# Patient Record
Sex: FEMALE | URBAN - METROPOLITAN AREA
[De-identification: names, ages, dates, MRNs, and addresses within clinical notes are randomized per-mention and may not be internally consistent; named-entity substitution may affect disease eponyms.]

---

## 2022-01-27 ENCOUNTER — APPOINTMENT (RX ONLY)
Dept: URBAN - METROPOLITAN AREA CLINIC 111 | Facility: CLINIC | Age: 37
Setting detail: DERMATOLOGY
End: 2022-01-27

## 2022-01-27 DIAGNOSIS — L259 CONTACT DERMATITIS AND OTHER ECZEMA, UNSPECIFIED CAUSE: ICD-10-CM

## 2022-01-27 DIAGNOSIS — L21.8 OTHER SEBORRHEIC DERMATITIS: ICD-10-CM | Status: INADEQUATELY CONTROLLED

## 2022-01-27 PROBLEM — L23.9 ALLERGIC CONTACT DERMATITIS, UNSPECIFIED CAUSE: Status: ACTIVE | Noted: 2022-01-27

## 2022-01-27 PROCEDURE — 99203 OFFICE O/P NEW LOW 30 MIN: CPT | Mod: 25

## 2022-01-27 PROCEDURE — ? INTRAMUSCULAR KENALOG

## 2022-01-27 PROCEDURE — ? DIAGNOSIS COMMENT

## 2022-01-27 PROCEDURE — ? COUNSELING

## 2022-01-27 PROCEDURE — ? PRESCRIPTION MEDICATION MANAGEMENT

## 2022-01-27 PROCEDURE — 96372 THER/PROPH/DIAG INJ SC/IM: CPT

## 2022-01-27 PROCEDURE — ? PHOTO-DOCUMENTATION

## 2022-01-27 PROCEDURE — ? PRESCRIPTION

## 2022-01-27 RX ORDER — CICLOPIROX 10 MG/.96ML
SHAMPOO TOPICAL
Qty: 120 | Refills: 11 | Status: ERX | COMMUNITY
Start: 2022-01-27

## 2022-01-27 RX ORDER — PIMECROLIMUS 10 MG/G
CREAM TOPICAL
Qty: 30 | Refills: 0 | Status: ERX | COMMUNITY
Start: 2022-01-27

## 2022-01-27 RX ORDER — FLUOCINONIDE 0.5 MG/ML
SOLUTION TOPICAL
Qty: 60 | Refills: 2 | Status: ERX | COMMUNITY
Start: 2022-01-27

## 2022-01-27 RX ADMIN — PIMECROLIMUS: 10 CREAM TOPICAL at 00:00

## 2022-01-27 RX ADMIN — FLUOCINONIDE: 0.5 SOLUTION TOPICAL at 00:00

## 2022-01-27 RX ADMIN — CICLOPIROX: 10 SHAMPOO TOPICAL at 00:00

## 2022-01-27 ASSESSMENT — LOCATION ZONE DERM
LOCATION ZONE: TRUNK
LOCATION ZONE: SCALP
LOCATION ZONE: FACE
LOCATION ZONE: EYELID

## 2022-01-27 ASSESSMENT — LOCATION SIMPLE DESCRIPTION DERM
LOCATION SIMPLE: SCALP
LOCATION SIMPLE: LEFT EYEBROW
LOCATION SIMPLE: RIGHT SUPERIOR EYELID
LOCATION SIMPLE: RIGHT EYEBROW
LOCATION SIMPLE: RIGHT LOWER BACK
LOCATION SIMPLE: LEFT SUPERIOR EYELID

## 2022-01-27 ASSESSMENT — LOCATION DETAILED DESCRIPTION DERM
LOCATION DETAILED: LEFT MEDIAL SUPERIOR EYELID
LOCATION DETAILED: RIGHT INFERIOR LATERAL LOWER BACK
LOCATION DETAILED: RIGHT CENTRAL EYEBROW
LOCATION DETAILED: LEFT SUPERIOR PARIETAL SCALP
LOCATION DETAILED: LEFT CENTRAL EYEBROW
LOCATION DETAILED: RIGHT LATERAL SUPERIOR EYELID

## 2022-01-27 NOTE — PROCEDURE: DIAGNOSIS COMMENT
Comment: Plan to obtain insurance benefits for patch testing. Of note patient has a h/o getting false eyelashes, nails, and hair dye. Patient elects for IMK today and will push out patch testing 1 month. Discussed d/c eyelashes once current set falls out.
Detail Level: Simple
Render Risk Assessment In Note?: no

## 2022-01-27 NOTE — HPI: RASH
What Type Of Note Output Would You Prefer (Optional)?: Bullet Format
How Severe Is Your Rash?: moderate
Is This A New Presentation, Or A Follow-Up?: Rash
Additional History: Patient reports rash started on scalp and now is on her eye lids. She has not been applying anything topically, and reports rash comes and goes.

## 2022-01-27 NOTE — PROCEDURE: PRESCRIPTION MEDICATION MANAGEMENT
Detail Level: Zone
Render In Strict Bullet Format?: No
Discontinue Regimen: -\\n- Aveeno makemaycol castillopes
Initiate Treatment: -\\n- Elidel 1 % topical cream: Apply a thin layer to the affected areas(eyelids) nightly. Recommend mixing the Elidel with CeraVe healing ointment before applying to eyelids.
Plan: Follow up prn\\n\\Javy reserve: patch testing
Initiate Treatment: -\\nciclopirox 1 % shampoo: Use as face/scalp wash in the shower QD until improved, then decrease to 2-3 times weekly for maintenance. Let sit 2-5 minutes prior to rinsing.\\n\\nfluocinonide 0.05 % topical solution: Work into scalp twice daily for up to two weeks on, one week off. Repeat as needed for flares. Avoid face/groin/axillae.

## 2025-04-21 NOTE — PROCEDURE: INTRAMUSCULAR KENALOG
Consent: The risks of IM Kenalog including weight gain, hunger, diabetes, increased blood sugars or blood pressure, osteoporosis, ulcers, sleeplessness, and psychiatric disturbance.
Detail Level: None
Administered By (Optional): Bhavin CHUA
Treatment Number (Optional): 1
Lot # (Optional): [id number]
Add Option For Additional Mediation: No
Concentration (Mg/Ml) Of Additional Medication: 2.5
Expiration Date (Optional): JUN 2022
Concentration (Mg/Ml): 40.0
Kenalog Preparation: kenalog
Total Volume (Ccs): 1.5
Dr. Elba Mcdaniel